# Patient Record
Sex: MALE | Race: BLACK OR AFRICAN AMERICAN | NOT HISPANIC OR LATINO | URBAN - METROPOLITAN AREA
[De-identification: names, ages, dates, MRNs, and addresses within clinical notes are randomized per-mention and may not be internally consistent; named-entity substitution may affect disease eponyms.]

---

## 2023-12-14 ENCOUNTER — LAB OUTSIDE AN ENCOUNTER (OUTPATIENT)
Dept: URBAN - METROPOLITAN AREA CLINIC 118 | Facility: CLINIC | Age: 63
End: 2023-12-14

## 2023-12-14 ENCOUNTER — OFFICE VISIT (OUTPATIENT)
Dept: URBAN - METROPOLITAN AREA CLINIC 118 | Facility: CLINIC | Age: 63
End: 2023-12-14
Payer: OTHER GOVERNMENT

## 2023-12-14 VITALS
DIASTOLIC BLOOD PRESSURE: 71 MMHG | HEART RATE: 87 BPM | TEMPERATURE: 97.3 F | HEIGHT: 75 IN | WEIGHT: 238.6 LBS | SYSTOLIC BLOOD PRESSURE: 115 MMHG | BODY MASS INDEX: 29.67 KG/M2

## 2023-12-14 DIAGNOSIS — R15.2 FECAL URGENCY: ICD-10-CM

## 2023-12-14 DIAGNOSIS — Z12.11 SCREEN FOR COLON CANCER: ICD-10-CM

## 2023-12-14 PROCEDURE — 99204 OFFICE O/P NEW MOD 45 MIN: CPT | Performed by: INTERNAL MEDICINE

## 2023-12-14 RX ORDER — DICYCLOMINE HYDROCHLORIDE 20 MG/1
1 TABLET TABLET ORAL
Qty: 90 | Refills: 5 | OUTPATIENT
Start: 2023-12-14 | End: 2024-06-11

## 2023-12-14 NOTE — HPI-TODAY'S VISIT:
pt presents for GI evaluation. pt h/o DM on Metformin. pt notes sicne earlier year noted loose non-blood stools with fecal urgency. Mild occasional rectal bleeding. No weight loss or anemia. No UGI symptoms. Pt seen by pmmena malhotra Metformin, start Jardiance. pt reports no weight loss or anemia. pt other complaints. pt due for colon cancer screening.

## 2023-12-14 NOTE — PHYSICAL EXAM GASTROINTESTINAL
Abdomen , soft, nontender, nondistended , no guarding or rigidity , no masses palpable , normal bowel sounds , Liver and Spleen , no hepatomegaly present , no hepatosplenomegaly , liver nontender , spleen not palpable Laps, needles and instruments count was reported as correct.

## 2024-01-22 ENCOUNTER — CLAIMS CREATED FROM THE CLAIM WINDOW (OUTPATIENT)
Dept: URBAN - METROPOLITAN AREA SURGERY CENTER 23 | Facility: SURGERY CENTER | Age: 64
End: 2024-01-22

## 2024-01-22 ENCOUNTER — OUT OF OFFICE VISIT (OUTPATIENT)
Dept: URBAN - METROPOLITAN AREA SURGERY CENTER 23 | Facility: SURGERY CENTER | Age: 64
End: 2024-01-22
Payer: OTHER GOVERNMENT

## 2024-01-22 ENCOUNTER — TELEPHONE ENCOUNTER (OUTPATIENT)
Dept: URBAN - METROPOLITAN AREA CLINIC 118 | Facility: CLINIC | Age: 64
End: 2024-01-22

## 2024-01-22 DIAGNOSIS — Z12.11 COLON CANCER SCREENING: ICD-10-CM

## 2024-01-22 DIAGNOSIS — Z53.8 PROCEDURE NOT CARRIED OUT FOR OTHER REASONS: ICD-10-CM

## 2024-01-22 PROCEDURE — 00812 ANES LWR INTST SCR COLSC: CPT | Performed by: NURSE ANESTHETIST, CERTIFIED REGISTERED

## 2024-01-22 RX ORDER — DICYCLOMINE HYDROCHLORIDE 20 MG/1
1 TABLET TABLET ORAL
Qty: 90 | Refills: 5 | Status: ACTIVE | COMMUNITY
Start: 2023-12-14 | End: 2024-06-11

## 2024-01-22 RX ORDER — POLYETHYLENE GLYCOL 3350, SODIUM SULFATE ANHYDROUS, SODIUM BICARBONATE, SODIUM CHLORIDE, POTASSIUM CHLORIDE 236; 22.74; 6.74; 5.86; 2.97 G/4L; G/4L; G/4L; G/4L; G/4L
AS DIRECTED POWDER, FOR SOLUTION ORAL 1
Qty: 1 | Refills: 0 | OUTPATIENT
Start: 2024-01-22 | End: 2024-01-23

## 2024-01-24 ENCOUNTER — OFFICE VISIT (OUTPATIENT)
Dept: URBAN - METROPOLITAN AREA SURGERY CENTER 23 | Facility: SURGERY CENTER | Age: 64
End: 2024-01-24
Payer: OTHER GOVERNMENT

## 2024-01-24 ENCOUNTER — TELEPHONE ENCOUNTER (OUTPATIENT)
Dept: URBAN - METROPOLITAN AREA CLINIC 118 | Facility: CLINIC | Age: 64
End: 2024-01-24

## 2024-01-24 ENCOUNTER — CLAIMS CREATED FROM THE CLAIM WINDOW (OUTPATIENT)
Dept: URBAN - METROPOLITAN AREA CLINIC 4 | Facility: CLINIC | Age: 64
End: 2024-01-24
Payer: OTHER GOVERNMENT

## 2024-01-24 DIAGNOSIS — K51.911 ULCERATIVE COLITIS, UNSPECIFIED WITH RECTAL BLEEDING: ICD-10-CM

## 2024-01-24 DIAGNOSIS — K62.89 MUCOSAL ABNORMALITY OF RECTUM: ICD-10-CM

## 2024-01-24 DIAGNOSIS — Z12.11 COLON CANCER SCREENING: ICD-10-CM

## 2024-01-24 DIAGNOSIS — K64.8 OTHER HEMORRHOIDS: ICD-10-CM

## 2024-01-24 DIAGNOSIS — K63.89 OTHER SPECIFIED DISEASES OF INTESTINE: ICD-10-CM

## 2024-01-24 DIAGNOSIS — R15.2 DEFECATION URGENCY: ICD-10-CM

## 2024-01-24 DIAGNOSIS — Z98.0 H/O BILLROTH II OPERATION: ICD-10-CM

## 2024-01-24 DIAGNOSIS — K51.20 ACUTE ULCERATIVE PROCTITIS: ICD-10-CM

## 2024-01-24 PROCEDURE — 45380 COLONOSCOPY AND BIOPSY: CPT | Performed by: INTERNAL MEDICINE

## 2024-01-24 PROCEDURE — 88341 IMHCHEM/IMCYTCHM EA ADD ANTB: CPT | Performed by: PATHOLOGY

## 2024-01-24 PROCEDURE — 88305 TISSUE EXAM BY PATHOLOGIST: CPT | Performed by: PATHOLOGY

## 2024-01-24 PROCEDURE — G8907 PT DOC NO EVENTS ON DISCHARG: HCPCS | Performed by: INTERNAL MEDICINE

## 2024-01-24 PROCEDURE — 00811 ANES LWR INTST NDSC NOS: CPT | Performed by: NURSE ANESTHETIST, CERTIFIED REGISTERED

## 2024-01-24 PROCEDURE — 88342 IMHCHEM/IMCYTCHM 1ST ANTB: CPT | Performed by: PATHOLOGY

## 2024-01-24 PROCEDURE — 00812 ANES LWR INTST SCR COLSC: CPT | Performed by: NURSE ANESTHETIST, CERTIFIED REGISTERED

## 2024-01-24 RX ORDER — DICYCLOMINE HYDROCHLORIDE 20 MG/1
1 TABLET TABLET ORAL
Qty: 90 | Refills: 5 | Status: ACTIVE | COMMUNITY
Start: 2023-12-14 | End: 2024-06-11

## 2024-01-24 RX ORDER — BUDESONIDE 3 MG/1
3 CAPSULES CAPSULE, DELAYED RELEASE PELLETS ORAL ONCE A DAY
Qty: 90 CAPSULE | Refills: 3 | OUTPATIENT
Start: 2024-01-24

## 2024-01-29 LAB
A/G RATIO: 1.2
ABSOLUTE BASOPHILS: 30
ABSOLUTE EOSINOPHILS: 301
ABSOLUTE LYMPHOCYTES: 1965
ABSOLUTE MONOCYTES: 543
ABSOLUTE NEUTROPHILS: 3062
ALBUMIN: 3.8
ALKALINE PHOSPHATASE: 79
ALT (SGPT): 15
ANCA SCREEN: (no result)
AST (SGOT): 17
ATYPICAL P ANCA TITER: (no result)
BASOPHILS: 0.5
BILIRUBIN, TOTAL: 0.5
BUN/CREATININE RATIO: (no result)
BUN: 12
C-REACTIVE PROTEIN, QUANT: 18.2
CALCIUM: 8.9
CARBON DIOXIDE, TOTAL: 27
CHLORIDE: 102
CREATININE: 0.94
EGFR: 91
EOSINOPHILS: 5.1
GASCA: 13.4
GLOBULIN, TOTAL: 3.2
GLUCOSE: 80
HEMATOCRIT: 40.4
HEMOGLOBIN: 13.5
LYMPHOCYTES: 33.3
MCH: 30.7
MCHC: 33.4
MCV: 91.8
MONOCYTES: 9.2
MPV: 10.7
MYELOPEROXIDASE ANTIBODY: <1
NEUTROPHILS: 51.9
PLATELET COUNT: 224
POTASSIUM: 4.2
PROTEIN, TOTAL: 7
PROTEINASE-3 ANTIBODY: <1
RDW: 12.1
RED BLOOD CELL COUNT: 4.4
SACCHAROMYCES CEREVISIAE AB (ASCA) (IGA): 4.9
SODIUM: 139
WHITE BLOOD CELL COUNT: 5.9

## 2024-04-11 ENCOUNTER — OV EP (OUTPATIENT)
Dept: URBAN - METROPOLITAN AREA CLINIC 118 | Facility: CLINIC | Age: 64
End: 2024-04-11
Payer: OTHER GOVERNMENT

## 2024-04-11 VITALS
SYSTOLIC BLOOD PRESSURE: 108 MMHG | DIASTOLIC BLOOD PRESSURE: 77 MMHG | BODY MASS INDEX: 30.93 KG/M2 | TEMPERATURE: 98.1 F | HEIGHT: 74 IN | HEART RATE: 70 BPM | WEIGHT: 241 LBS

## 2024-04-11 DIAGNOSIS — K51.919 ULCERATIVE COLITIS WITH COMPLICATION, UNSPECIFIED LOCATION: ICD-10-CM

## 2024-04-11 DIAGNOSIS — K52.9 COLITIS: ICD-10-CM

## 2024-04-11 PROBLEM — 64766004: Status: ACTIVE | Noted: 2024-04-11

## 2024-04-11 PROCEDURE — 99214 OFFICE O/P EST MOD 30 MIN: CPT | Performed by: INTERNAL MEDICINE

## 2024-04-11 RX ORDER — DICYCLOMINE HYDROCHLORIDE 20 MG/1
1 TABLET TABLET ORAL
Qty: 90 | Refills: 5 | Status: ACTIVE | COMMUNITY
Start: 2023-12-14 | End: 2024-06-11

## 2024-04-11 RX ORDER — BUDESONIDE 3 MG/1
3 CAPSULES CAPSULE, DELAYED RELEASE PELLETS ORAL ONCE A DAY
Qty: 90 CAPSULE | Refills: 3 | Status: ACTIVE | COMMUNITY
Start: 2024-01-24

## 2024-04-11 RX ORDER — MESALAMINE 1.2 G/1
4 TABLETS TABLET, DELAYED RELEASE ORAL ONCE A DAY
Qty: 120 TABLET | Refills: 5 | OUTPATIENT
Start: 2024-04-11 | End: 2024-10-08

## 2024-04-11 NOTE — HPI-FREE TEXT
pt presents for GI follow up after recent colonoscopy with bx's consistent with UC and started on Budesonide. pt reports some improvement in symptoms but continued loose stools without blood. No abdominal pain or other LGI symptoms. No UGI symptoms. pt denies other complaints. Pt feels needs further medical treatment.

## 2024-05-30 ENCOUNTER — ERX REFILL RESPONSE (OUTPATIENT)
Dept: URBAN - METROPOLITAN AREA CLINIC 118 | Facility: CLINIC | Age: 64
End: 2024-05-30

## 2024-05-30 RX ORDER — BUDESONIDE 3 MG/1
3 CAPSULES CAPSULE, DELAYED RELEASE PELLETS ORAL ONCE A DAY
Qty: 90 CAPSULE | Refills: 3 | OUTPATIENT

## 2024-05-30 RX ORDER — BUDESONIDE 3 MG/1
TAKE 3 CAPSULES BY MOUTH EVERY DAY CAPSULE ORAL
Qty: 90 CAPSULE | Refills: 0 | OUTPATIENT

## 2024-07-23 ENCOUNTER — ERX REFILL RESPONSE (OUTPATIENT)
Dept: URBAN - METROPOLITAN AREA CLINIC 118 | Facility: CLINIC | Age: 64
End: 2024-07-23

## 2024-07-23 RX ORDER — BUDESONIDE 3 MG/1
TAKE 3 CAPSULES BY MOUTH EVERY DAY CAPSULE ORAL
Qty: 90 CAPSULE | Refills: 0 | OUTPATIENT

## 2024-08-15 ENCOUNTER — OFFICE VISIT (OUTPATIENT)
Dept: URBAN - METROPOLITAN AREA CLINIC 118 | Facility: CLINIC | Age: 64
End: 2024-08-15
Payer: OTHER GOVERNMENT

## 2024-08-15 ENCOUNTER — DASHBOARD ENCOUNTERS (OUTPATIENT)
Age: 64
End: 2024-08-15

## 2024-08-15 VITALS
DIASTOLIC BLOOD PRESSURE: 77 MMHG | HEIGHT: 76 IN | TEMPERATURE: 97.2 F | WEIGHT: 250.2 LBS | HEART RATE: 57 BPM | BODY MASS INDEX: 30.47 KG/M2 | SYSTOLIC BLOOD PRESSURE: 117 MMHG

## 2024-08-15 DIAGNOSIS — K62.5 RECTAL BLEEDING: ICD-10-CM

## 2024-08-15 DIAGNOSIS — R15.2 FECAL URGENCY: ICD-10-CM

## 2024-08-15 DIAGNOSIS — K52.89 OTHER SPECIFIED NONINFECTIVE GASTROENTERITIS AND COLITIS: ICD-10-CM

## 2024-08-15 PROCEDURE — 99214 OFFICE O/P EST MOD 30 MIN: CPT | Performed by: INTERNAL MEDICINE

## 2024-08-15 RX ORDER — BUDESONIDE 3 MG/1
TAKE 3 CAPSULES BY MOUTH EVERY DAY CAPSULE ORAL
Qty: 90 CAPSULE | Refills: 0 | Status: ACTIVE | COMMUNITY

## 2024-08-15 RX ORDER — MESALAMINE 1.2 G/1
4 TABLETS TABLET, DELAYED RELEASE ORAL ONCE A DAY
Qty: 120 TABLET | Refills: 5 | Status: ACTIVE | COMMUNITY
Start: 2024-04-11 | End: 2024-10-08

## 2024-08-15 NOTE — HPI-TODAY'S VISIT:
pt presents for GI follow up. Pt IBD recently on Budesonide 9 mg po qd and Lialda 4 tab po qd. pt reports overall symptoms inproved, now qd bm, denies rectal bleeding or mucousy stools. No abdominal pain. No UGI symptoms including nausea, vomiting, gerd or dysphagia. pt titrating down meds, now Lialda 2 tab po qd and Budesonide 3 mg po qd.

## 2024-10-17 ENCOUNTER — TELEPHONE ENCOUNTER (OUTPATIENT)
Dept: URBAN - METROPOLITAN AREA CLINIC 118 | Facility: CLINIC | Age: 64
End: 2024-10-17

## 2024-10-17 RX ORDER — MESALAMINE 1.2 G/1
4 TABLETS TABLET, DELAYED RELEASE ORAL ONCE A DAY
Qty: 360 TABLET | Refills: 3 | OUTPATIENT
Start: 2024-10-17 | End: 2025-10-12

## 2024-11-07 ENCOUNTER — TELEPHONE ENCOUNTER (OUTPATIENT)
Dept: URBAN - METROPOLITAN AREA CLINIC 118 | Facility: CLINIC | Age: 64
End: 2024-11-07

## 2024-11-07 RX ORDER — MESALAMINE 1.2 G/1
4 TABLETS TABLET, DELAYED RELEASE ORAL ONCE A DAY
Qty: 360 TABLET | Refills: 3
Start: 2024-10-17 | End: 2025-11-02

## 2025-07-24 ENCOUNTER — P2P PATIENT RECORD (OUTPATIENT)
Age: 65
End: 2025-07-24